# Patient Record
Sex: FEMALE | Race: WHITE | NOT HISPANIC OR LATINO | Employment: UNEMPLOYED | URBAN - METROPOLITAN AREA
[De-identification: names, ages, dates, MRNs, and addresses within clinical notes are randomized per-mention and may not be internally consistent; named-entity substitution may affect disease eponyms.]

---

## 2017-04-28 ENCOUNTER — HOSPITAL ENCOUNTER (EMERGENCY)
Facility: MEDICAL CENTER | Age: 37
End: 2017-04-30
Attending: EMERGENCY MEDICINE
Payer: MEDICAID

## 2017-04-28 DIAGNOSIS — K04.7 INFECTED DENTAL CARIES: ICD-10-CM

## 2017-04-28 DIAGNOSIS — N30.00 ACUTE CYSTITIS WITHOUT HEMATURIA: ICD-10-CM

## 2017-04-28 DIAGNOSIS — F32.A DEPRESSION, UNSPECIFIED DEPRESSION TYPE: ICD-10-CM

## 2017-04-28 DIAGNOSIS — K02.9 INFECTED DENTAL CARIES: ICD-10-CM

## 2017-04-28 DIAGNOSIS — T50.902A INTENTIONAL DRUG OVERDOSE, INITIAL ENCOUNTER (HCC): Primary | ICD-10-CM

## 2017-04-28 DIAGNOSIS — Z86.59 HISTORY OF POSTTRAUMATIC STRESS DISORDER (PTSD): ICD-10-CM

## 2017-04-28 LAB
ALBUMIN SERPL BCP-MCNC: 4.4 G/DL (ref 3.2–4.9)
ALBUMIN/GLOB SERPL: 1.6 G/DL
ALP SERPL-CCNC: 97 U/L (ref 30–99)
ALT SERPL-CCNC: 46 U/L (ref 2–50)
ANION GAP SERPL CALC-SCNC: 9 MMOL/L (ref 0–11.9)
APAP SERPL-MCNC: <10 UG/ML (ref 10–30)
AST SERPL-CCNC: 101 U/L (ref 12–45)
BASOPHILS # BLD AUTO: 0.2 % (ref 0–1.8)
BASOPHILS # BLD: 0.04 K/UL (ref 0–0.12)
BILIRUB SERPL-MCNC: 0.7 MG/DL (ref 0.1–1.5)
BUN SERPL-MCNC: 9 MG/DL (ref 8–22)
CALCIUM SERPL-MCNC: 9.1 MG/DL (ref 8.5–10.5)
CHLORIDE SERPL-SCNC: 102 MMOL/L (ref 96–112)
CO2 SERPL-SCNC: 25 MMOL/L (ref 20–33)
CREAT SERPL-MCNC: 0.69 MG/DL (ref 0.5–1.4)
EOSINOPHIL # BLD AUTO: 0.18 K/UL (ref 0–0.51)
EOSINOPHIL NFR BLD: 1.1 % (ref 0–6.9)
ERYTHROCYTE [DISTWIDTH] IN BLOOD BY AUTOMATED COUNT: 41.8 FL (ref 35.9–50)
ETHANOL BLD-MCNC: 0 G/DL
GFR SERPL CREATININE-BSD FRML MDRD: >60 ML/MIN/1.73 M 2
GLOBULIN SER CALC-MCNC: 2.8 G/DL (ref 1.9–3.5)
GLUCOSE SERPL-MCNC: 94 MG/DL (ref 65–99)
HCG SERPL QL: NEGATIVE
HCT VFR BLD AUTO: 47.2 % (ref 37–47)
HGB BLD-MCNC: 16.1 G/DL (ref 12–16)
IMM GRANULOCYTES # BLD AUTO: 0.06 K/UL (ref 0–0.11)
IMM GRANULOCYTES NFR BLD AUTO: 0.4 % (ref 0–0.9)
LIPASE SERPL-CCNC: 18 U/L (ref 11–82)
LYMPHOCYTES # BLD AUTO: 2.02 K/UL (ref 1–4.8)
LYMPHOCYTES NFR BLD: 12.1 % (ref 22–41)
MCH RBC QN AUTO: 32.7 PG (ref 27–33)
MCHC RBC AUTO-ENTMCNC: 34.1 G/DL (ref 33.6–35)
MCV RBC AUTO: 95.7 FL (ref 81.4–97.8)
MONOCYTES # BLD AUTO: 0.75 K/UL (ref 0–0.85)
MONOCYTES NFR BLD AUTO: 4.5 % (ref 0–13.4)
NEUTROPHILS # BLD AUTO: 13.7 K/UL (ref 2–7.15)
NEUTROPHILS NFR BLD: 81.7 % (ref 44–72)
NRBC # BLD AUTO: 0 K/UL
NRBC BLD AUTO-RTO: 0 /100 WBC
PLATELET # BLD AUTO: 319 K/UL (ref 164–446)
PMV BLD AUTO: 9.5 FL (ref 9–12.9)
POTASSIUM SERPL-SCNC: 3.9 MMOL/L (ref 3.6–5.5)
PROT SERPL-MCNC: 7.2 G/DL (ref 6–8.2)
RBC # BLD AUTO: 4.93 M/UL (ref 4.2–5.4)
SALICYLATES SERPL-MCNC: 0 MG/DL (ref 15–25)
SODIUM SERPL-SCNC: 136 MMOL/L (ref 135–145)
WBC # BLD AUTO: 16.8 K/UL (ref 4.8–10.8)

## 2017-04-28 PROCEDURE — 83690 ASSAY OF LIPASE: CPT

## 2017-04-28 PROCEDURE — 84703 CHORIONIC GONADOTROPIN ASSAY: CPT

## 2017-04-28 PROCEDURE — 85025 COMPLETE CBC W/AUTO DIFF WBC: CPT

## 2017-04-28 PROCEDURE — 80307 DRUG TEST PRSMV CHEM ANLYZR: CPT

## 2017-04-28 PROCEDURE — 99285 EMERGENCY DEPT VISIT HI MDM: CPT

## 2017-04-28 PROCEDURE — 80053 COMPREHEN METABOLIC PANEL: CPT

## 2017-04-28 RX ORDER — TRAMADOL HYDROCHLORIDE 50 MG/1
50 TABLET ORAL EVERY 4 HOURS PRN
COMMUNITY

## 2017-04-28 ASSESSMENT — PAIN SCALES - GENERAL: PAINLEVEL_OUTOF10: 0

## 2017-04-29 LAB
AMPHET UR QL SCN: POSITIVE
APPEARANCE UR: ABNORMAL
BACTERIA #/AREA URNS HPF: ABNORMAL /HPF
BARBITURATES UR QL SCN: NEGATIVE
BENZODIAZ UR QL SCN: NEGATIVE
BILIRUB UR QL STRIP.AUTO: NEGATIVE
BZE UR QL SCN: NEGATIVE
CANNABINOIDS UR QL SCN: NEGATIVE
COLOR UR: YELLOW
EKG IMPRESSION: NORMAL
EPI CELLS #/AREA URNS HPF: ABNORMAL /HPF
GLUCOSE UR STRIP.AUTO-MCNC: NEGATIVE MG/DL
HYALINE CASTS #/AREA URNS LPF: ABNORMAL /LPF
KETONES UR STRIP.AUTO-MCNC: 20 MG/DL
LEUKOCYTE ESTERASE UR QL STRIP.AUTO: ABNORMAL
MDMA UR QL SCN: NEGATIVE
METHADONE UR QL SCN: NEGATIVE
MICRO URNS: ABNORMAL
MUCOUS THREADS #/AREA URNS HPF: ABNORMAL /HPF
NITRITE UR QL STRIP.AUTO: NEGATIVE
OPIATES UR QL SCN: NEGATIVE
OXYCODONE UR QL SCN: NEGATIVE
PCP UR QL SCN: NEGATIVE
PH UR STRIP.AUTO: 5.5 [PH]
PROPOXYPH UR QL SCN: NEGATIVE
PROT UR QL STRIP: NEGATIVE MG/DL
RBC # URNS HPF: ABNORMAL /HPF
RBC UR QL AUTO: NEGATIVE
SP GR UR STRIP.AUTO: 1.02
WBC #/AREA URNS HPF: ABNORMAL /HPF

## 2017-04-29 PROCEDURE — A9270 NON-COVERED ITEM OR SERVICE: HCPCS | Performed by: EMERGENCY MEDICINE

## 2017-04-29 PROCEDURE — 700102 HCHG RX REV CODE 250 W/ 637 OVERRIDE(OP): Performed by: EMERGENCY MEDICINE

## 2017-04-29 PROCEDURE — 90791 PSYCH DIAGNOSTIC EVALUATION: CPT

## 2017-04-29 PROCEDURE — 80307 DRUG TEST PRSMV CHEM ANLYZR: CPT

## 2017-04-29 PROCEDURE — 93005 ELECTROCARDIOGRAM TRACING: CPT | Performed by: EMERGENCY MEDICINE

## 2017-04-29 PROCEDURE — 81001 URINALYSIS AUTO W/SCOPE: CPT | Mod: 59

## 2017-04-29 RX ORDER — NICOTINE 21 MG/24HR
1 PATCH, TRANSDERMAL 24 HOURS TRANSDERMAL ONCE
Status: COMPLETED | OUTPATIENT
Start: 2017-04-29 | End: 2017-04-30

## 2017-04-29 RX ORDER — AMOXICILLIN AND CLAVULANATE POTASSIUM 875; 125 MG/1; MG/1
1 TABLET, FILM COATED ORAL 2 TIMES DAILY
COMMUNITY

## 2017-04-29 RX ORDER — AMOXICILLIN AND CLAVULANATE POTASSIUM 875; 125 MG/1; MG/1
1 TABLET, FILM COATED ORAL 2 TIMES DAILY
Qty: 20 TAB | Refills: 0 | Status: SHIPPED | OUTPATIENT
Start: 2017-04-29 | End: 2017-05-09

## 2017-04-29 RX ORDER — AMOXICILLIN AND CLAVULANATE POTASSIUM 875; 125 MG/1; MG/1
1 TABLET, FILM COATED ORAL EVERY 12 HOURS
Status: DISCONTINUED | OUTPATIENT
Start: 2017-04-29 | End: 2017-04-30 | Stop reason: HOSPADM

## 2017-04-29 RX ORDER — AMOXICILLIN AND CLAVULANATE POTASSIUM 875; 125 MG/1; MG/1
1 TABLET, FILM COATED ORAL ONCE
Status: COMPLETED | OUTPATIENT
Start: 2017-04-29 | End: 2017-04-29

## 2017-04-29 RX ORDER — NICOTINE 21 MG/24HR
1 PATCH, TRANSDERMAL 24 HOURS TRANSDERMAL ONCE
Status: DISCONTINUED | OUTPATIENT
Start: 2017-04-29 | End: 2017-04-29

## 2017-04-29 RX ORDER — LORAZEPAM 1 MG/1
1 TABLET ORAL ONCE
Status: COMPLETED | OUTPATIENT
Start: 2017-04-29 | End: 2017-04-29

## 2017-04-29 RX ADMIN — AMOXICILLIN AND CLAVULANATE POTASSIUM 1 TABLET: 875; 125 TABLET, FILM COATED ORAL at 20:30

## 2017-04-29 RX ADMIN — AMOXICILLIN AND CLAVULANATE POTASSIUM 1 TABLET: 875; 125 TABLET, FILM COATED ORAL at 05:56

## 2017-04-29 RX ADMIN — LORAZEPAM 1 MG: 1 TABLET ORAL at 14:00

## 2017-04-29 RX ADMIN — NICOTINE 14 MG: 14 PATCH, EXTENDED RELEASE TOPICAL at 06:30

## 2017-04-29 RX ADMIN — NICOTINE 1 PATCH: 21 PATCH, EXTENDED RELEASE TRANSDERMAL at 14:24

## 2017-04-29 ASSESSMENT — PAIN SCALES - GENERAL: PAINLEVEL_OUTOF10: 0

## 2017-04-29 NOTE — ED NOTES
Received report from Ryan BENITEZ. Pt resting in bed under blankets, no distress observed. Even and unlabored breathing. Direct observation maintained with sitter present.

## 2017-04-29 NOTE — ED PROVIDER NOTES
"ED Provider Note    CHIEF COMPLAINT  Chief Complaint   Patient presents with   • Suicidal Ideation     pt intentionally took 20 tramadol today to try to kill herself, pt crying ambulatory without difficulty, aox4, anxious, denies any vomiting since took pills aprox 1830 tonight has medication for an abscessed tooth she has    • Drug Overdose       HPI  Latasha Clemente is a 36 y.o. female who presents to the emergency department by ambulance for intentional drug overdose. Patient states she \"just wanted to end it.\" History of PTSD, anxiety noncompliant with Geodon and Seroquel. States she has been at both [facility] previously. Patient is being treated for an infected tooth, intermittent compliant with the antibiotics, however states she was given tramadol for her discomfort. She states she took 20 tramadol tablets at approximately 6:30 PM (4 hours prior to arrival). Denies nausea or vomiting since that time. Denies abdominal pain. Denies sleepiness or altered mental status. No chest pain or shortness of breath. Admits to tobacco and methamphetamine use. Last meth use, yesterday, both inhaled and injected. Denies alcohol use. Denies other illicit drug use. Previous SI attempt with self-inflicted wounds, lacerations to right forearm.    REVIEW OF SYSTEMS  See HPI for further details. All other systems are negative.     PAST MEDICAL HISTORY   has a past medical history of Psychiatric disorder.    SOCIAL HISTORY  Social History     Social History Main Topics   • Smoking status: Current Every Day Smoker -- 0.50 packs/day     Types: Cigarettes   • Smokeless tobacco: Not on file   • Alcohol Use: Yes      Comment: occasionally   • Drug Use: Yes     Special: Inhaled, Intravenous      Comment: IV meth used yesterday   • Sexual Activity: Not on file       SURGICAL HISTORY  patient denies any surgical history    CURRENT MEDICATIONS  Home Medications     Reviewed by Carri Zhao RCHARLOTTE. " "(Registered Nurse) on 04/28/17 at 2206  Med List Status: Not Addressed    Medication Last Dose Status    tramadol (ULTRAM) 50 MG Tab  Active                ALLERGIES  Allergies   Allergen Reactions   • Naproxen Swelling   • Pomegranate [[place]] Swelling       PHYSICAL EXAM  VITAL SIGNS: /77 mmHg  Pulse 88  Temp(Src) 37.1 °C (98.8 °F)  Resp 16  Ht 1.651 m (5' 5\")  Wt 71 kg (156 lb 8.4 oz)  BMI 26.05 kg/m2  SpO2 91%  LMP 04/14/2017 (Approximate)  Pulse ox interpretation: I interpret this pulse ox as normal.  Constitutional: Alert in no apparent distress. Tearful. Histrionic. Disheveled.  HENT: Normocephalic, atraumatic. Bilateral external ears normal, Nose normal. Moist mucous membranes.  Poor dentition. Missing and fractured teeth. Tenderness to percussion burst right upper molar evidence for dental carry. No gingival swelling or fluctuance. No facial swelling or cellulitis. Uvula midline, tolerating secretions. No stridor or dysphonia.  Eyes: Pupils are equal and reactive, Conjunctiva normal.   Neck: Normal range of motion, Supple. No evidence of meningeal irritation.  Cardiovascular: Regular rate and rhythm, no murmurs. Distal pulses intact.    Thorax & Lungs: Normal breath sounds.  No wheezing/rales/ronchi. No increased work of breathing.   Abdomen: Soft, non-distended, non-tender to palpation. No rebound, guarding or peritonitis.  Skin: Warm, Dry, No erythema, No rash.   Musculoskeletal: Good range of motion in all major joints.   Neurologic: Alert , no gross focal deficit noted.  Psychiatric: Tearful. Histrionic at times. Depressed. Suicidal ideation and attempt. Cooperative.      DIAGNOSTIC STUDIES / PROCEDURES    EKG  I interpreted this EKG myself.  This is a 12-lead study.  The rhythm is sinus with a rate of 81.  There are no ST segment nor T wave abnormalities. Normal intervals, . Interpretation: No ST segment elevation myocardial infarction no ectopy. No arrhythmia.      LABS  Results " for orders placed or performed during the hospital encounter of 04/28/17   Blood Alcohol   Result Value Ref Range    Diagnostic Alcohol 0.00 0.00 g/dL   CBC WITH DIFFERENTIAL   Result Value Ref Range    WBC 16.8 (H) 4.8 - 10.8 K/uL    RBC 4.93 4.20 - 5.40 M/uL    Hemoglobin 16.1 (H) 12.0 - 16.0 g/dL    Hematocrit 47.2 (H) 37.0 - 47.0 %    MCV 95.7 81.4 - 97.8 fL    MCH 32.7 27.0 - 33.0 pg    MCHC 34.1 33.6 - 35.0 g/dL    RDW 41.8 35.9 - 50.0 fL    Platelet Count 319 164 - 446 K/uL    MPV 9.5 9.0 - 12.9 fL    Neutrophils-Polys 81.70 (H) 44.00 - 72.00 %    Lymphocytes 12.10 (L) 22.00 - 41.00 %    Monocytes 4.50 0.00 - 13.40 %    Eosinophils 1.10 0.00 - 6.90 %    Basophils 0.20 0.00 - 1.80 %    Immature Granulocytes 0.40 0.00 - 0.90 %    Nucleated RBC 0.00 /100 WBC    Neutrophils (Absolute) 13.70 (H) 2.00 - 7.15 K/uL    Lymphs (Absolute) 2.02 1.00 - 4.80 K/uL    Monos (Absolute) 0.75 0.00 - 0.85 K/uL    Eos (Absolute) 0.18 0.00 - 0.51 K/uL    Baso (Absolute) 0.04 0.00 - 0.12 K/uL    Immature Granulocytes (abs) 0.06 0.00 - 0.11 K/uL    NRBC (Absolute) 0.00 K/uL   COMP METABOLIC PANEL   Result Value Ref Range    Sodium 136 135 - 145 mmol/L    Potassium 3.9 3.6 - 5.5 mmol/L    Chloride 102 96 - 112 mmol/L    Co2 25 20 - 33 mmol/L    Anion Gap 9.0 0.0 - 11.9    Glucose 94 65 - 99 mg/dL    Bun 9 8 - 22 mg/dL    Creatinine 0.69 0.50 - 1.40 mg/dL    Calcium 9.1 8.5 - 10.5 mg/dL    AST(SGOT) 101 (H) 12 - 45 U/L    ALT(SGPT) 46 2 - 50 U/L    Alkaline Phosphatase 97 30 - 99 U/L    Total Bilirubin 0.7 0.1 - 1.5 mg/dL    Albumin 4.4 3.2 - 4.9 g/dL    Total Protein 7.2 6.0 - 8.2 g/dL    Globulin 2.8 1.9 - 3.5 g/dL    A-G Ratio 1.6 g/dL   LIPASE   Result Value Ref Range    Lipase 18 11 - 82 U/L   HCG QUAL SERUM   Result Value Ref Range    Beta-Hcg Qualitative Serum Negative Negative   Acetaminophen Level   Result Value Ref Range    Acetaminophen -Tylenol <10 10 - 30 ug/mL   SALICYLATE LEVEL   Result Value Ref Range    Salicylates,  Quant. 0 (L) 15 - 25 mg/dL   ESTIMATED GFR   Result Value Ref Range    GFR If African American >60 >60 mL/min/1.73 m 2    GFR If Non African American >60 >60 mL/min/1.73 m 2   EKG (NOW)   Result Value Ref Range    Report       [facility] Emergency Dept.    Test Date:  2017  Pt Name:    ASH RODRIGUEZ            Department: ER  MRN:        [id number]                      Room:       [facility]  Gender:     F                            Technician: [id number]  :        1980                   Requested By:HANNAH FERRELL  Order #:    [id number]                    Reading MD:    Measurements  Intervals                                Axis  Rate:       81                           P:          70  RI:         132                          QRS:        77  QRSD:       82                           T:          47  QT:         384  QTc:        446    Interpretive Statements  SINUS RHYTHM  CONSIDER RIGHT ATRIAL ABNORMALITY  No previous ECG available for comparison         COURSE & MEDICAL DECISION MAKING  Nursing notes and vital signs were reviewed. (See chart for details)  The patients records were reviewed, history was obtained from the patient;     Patient placed on a legal hold by nursing staff on arrival.    10:41 PM poison control. Case number [id number]. ELI Botello.  Observe for seizures, treated with benzodiazepines. Serotonin syndrome, treated with benzodiazepines and IV fluid. CNS or respiratory depression treat with symptomatic care. Observe for 6 hours from ingestion and/or until asymptomatic. Tox labs per routine.    Additionally, patient with history of dental pain, suspect infected dental caries given exam. No evidence for facial cellulitis. No oral abscess or airway compromise. She will be treated with Augmentin for 10 days, 1st dose provided in the emergency department.    0130 - labs are unremarkable, normal/negative acetaminophen and salicylate drawn 4 hours postingestion. Patient  remains asymptomatic. She is sleeping but arousable and tolerating oral fluids. Vital signs remained stable without tachycardia or labile blood pressure. She was never hypoxic. Patient is medically cleared for likely skilled evaluation.    0220 - seen and evaluated at bedside by karen Cobb. Although patient denies suicidal ideation at this time, she had attempted suicide with reported drug overdose prior to arrival. Patient with history of psychiatric illness, noncompliant with medications, legal hold has been completed. She will be transferred to psychiatric facility when a bed is available. After such evaluation, patient wishes to detox as she states, she has been referred to [facility].       FINAL IMPRESSION  (T50.902A) Intentional drug overdose, initial encounter (CMS-[facility])  (primary encounter diagnosis)  (F32.9) Depression, unspecified depression type  (Z86.59) History of posttraumatic stress disorder (PTSD)  (K02.9,  K04.7) Infected dental caries      Electronically signed by: Carri Foster, 4/28/2017 10:29 PM      This dictation was created using voice recognition software. The accuracy of the dictation is limited to the abilities of the software. I expect there may be some errors of grammar and possibly content. The nursing notes were reviewed and certain aspects of this information were incorporated into this note.

## 2017-04-29 NOTE — ED NOTES
Alert team consult completed. EKG now being completed. Pt aware of POC. Will provide urine.   Remains on direct obs of designee monitoring in hallway.

## 2017-04-29 NOTE — ED NOTES
.  Chief Complaint   Patient presents with   • Suicidal Ideation     pt intentionally took 20 tramadol today to try to kill herself, pt crying ambulatory without difficulty, aox4, anxious, denies any vomiting since took pills aprox 1830 tonight has medication for an abscessed tooth she has    • Drug Overdose

## 2017-04-29 NOTE — CONSULTS
[facility]   TRIAGE ASSESSMENT    Name: Latasha Clemente  MRN: [id number]  : 1980  Age: 36 y.o.  Date of assessment: 2017  PCP: Pcp Pt States None  Persons in attendance: Patient    CHIEF COMPLAINT/PRESENTING ISSUE (as stated by Latasha):   Chief Complaint   Patient presents with   • Suicidal Ideation     pt intentionally took 20 tramadol today to try to kill herself, pt crying ambulatory without difficulty, aox4, anxious, denies any vomiting since took pills aprox 183 tonight has medication for an abscessed tooth she has    • Drug Overdose        CURRENT LIVING SITUATION/SOCIAL SUPPORT: homeless    BEHAVIORAL HEALTH TREATMENT HISTORY  Does patient/parent report a history of prior behavioral health treatment for patient?   No:    SAFETY ASSESSMENT - SELF  Does patient acknowledge current or past symptoms of dangerousness to self?Patient initially stated that she overdosed on the Tramadol in a suicide attempt;  I have 2 children and I  Can't be a bad example ([facility],living with their father)  Does parent/significant other report patient has current or past symptoms of dangerousness to self? N\A  Does presenting problem suggest symptoms of dangerousness to self? yes    SAFETY ASSESSMENT - OTHERS  Does patient acknowledge current or past symptoms of aggressive behavior or risk to others? no  Does parent/significant other report patient has current or past symptoms of aggressive behavior or risk to others?  N\A  Does presenting problem suggest symptoms of dangerousness to others? No    Crisis Safety Plan completed and copy given to patient? no    ABUSE/NEGLECT SCREENING  Does patient report feeling “unsafe” in his/her home, or afraid of anyone?  no  Does patient report any history of physical, sexual, or emotional abuse?  no  Does parent or significant other report any of the above? no  Is there evidence of neglect by self?  no  Is there evidence of neglect by a caregiver? no  Does the  "patient/parent report any history of CPS/APS/police involvement related to suspected abuse/neglect or domestic violence? no  Based on the information provided during the current assessment, is a mandated report of suspected abuse/neglect being made?  No    SUBSTANCE USE SCREENING  Yes:  Gagan all substances used in the past 30 days:      Last Use Amount   [x]   Alcohol Swishing around in my mouth Almost never   [x]   Marijuana 1 x per year    []   Heroin     []   Prescription Opioids  (used without prescription, for    recreation, or in excess of prescribed amount)     []   Other Prescription  (used without prescription, for    recreation, or in excess of prescribed amount)     []   Cocaine      [x]   Methamphetamine everyday 20 or 40 sack   []   \"\" drugs (ectasy, MDMA)     []   Other substances        UDS results: meth  Breathalyzer results: negative    What consequences does the patient associate with any of the above substance use and or addictive behaviors? Work problems or losses: , Relationship problems: , Family problems: , Health problems: , Monetary problems:     Risk factors for detox (check all that apply):  []  Seizures   []  Diaphoretic (sweating)   [x]  Tremors   []  Hallucinations   [x]  Increased blood pressure   []  Decreased blood pressure   [x] anxiety   []  None      [] Patient education on risk factors for detoxification and instructed to return to ER as needed.    UDS results:   Breathalyzer results:   Risk factors for detox (check all that apply):  [] Seizures   [] Diaphoretic (sweating)   [] Tremors   [] Hallucinations   [] Increased blood pressure   [] Decreased blood pressure   [] Other    [] Patient education on risk factors for detoxification and instructed to return to ER as needed.  MENTAL STATUS   Participation: Active verbal participation  Grooming: Casual and Neat  Orientation: Fully Oriented and Drowsy/Somnolent  Behavior: Calm  Eye contact: Limited  Mood: Depressed and " "Anxious  Affect: Congruent with content  Thought process: Logical and Goal-directed  Thought content: Within normal limits  Speech: Rate within normal limits and Soft  Perception: Within normal limits  Memory:  No gross evidence of memory deficits  Insight: Adequate  Judgment:  Adequate  Other:    Source:  [] Significant other present in person:   [] Significant other by telephone  [] [facility]   [x] [facility] Nursing Staff/Dr Foster F15.20  [x] [facility] Medical Record  [] Other:     [] Unable to complete full assessment due to:  [] Acute intoxication  [] Patient declined to participate/engage  [] Patient verbally unresponsive  [] Significant cognitive deficits  [] Significant perceptual distortions or behavioral disorganization  [] Other:      CLINICAL IMPRESSIONS:  Primary:  Amphetamine use D/O  Secondary:     MDD F33.9    IDENTIFIED NEEDS/PLAN: r DISPOSITION list for any items marked]    [x]  Imminent safety risk - self [] Imminent safety risk - others   [x]  Acute substance withdrawl []  Psychosis/Impaired reality testing   [x]  Mood/anxiety [x]  Substance use/Addictive behavior   []  Maladaptive behaviro []  Parent/child conflict   []  Family/Couples conflict []  Biomedical   [x]  Housing []  Financial   []   Legal  Occupational/Educational   []  Domestic violence []  Other:     Disposition: Refer to [facility] and [facility]    Does patient express agreement with the above plan? no    Referral appointment(s) scheduled? no    Alert team only: 36 year old female reports that she took an overdose of 20 Tramadol in a suicide attempt.  Uses \"as much meth as I can get my hands on\" daily.  I have discussed findings and recommendations with Dr. Foster who is in agreement with these recommendations.     Referral documentation sent to the following facilities:  [facility] BROCK/REGINALDO Kay R.N.  4/29/2017                "

## 2017-04-29 NOTE — ED NOTES
Patient is being verbally confrontational and cursing at staff. The patient is cursing at Alert team RN. ER Charge Nurse notified and patient will be transported back to [facility].

## 2017-04-29 NOTE — ED NOTES
"Pt requesting something \"for her withdrawal\". PT ate her breakfast tray on the way down to [facility], informed she will soon get a lunch tray. PT alert/oriented. Pt told CNA down stairs that \"she doesn't want to talk to her boyfriend cause he's a douche, and she doesn't want him to know she's here\". Pt inquired if she had any phone calls. Informed she has not received any calls.   "

## 2017-04-29 NOTE — CONSULTS
Latasha was taken to [facility] but started agitating the other pt. Acting out, cussing this f--- place.  Called security and charge desk for bed assignment to bring back up stairs.  She is not appropriate at this time to be in that environment. Manic at this time with mult. C/o about staff and [facility]. Medication given for lessening of her escalating behavior. Awaiting acceptance [facility] for evaluation and treatment.

## 2017-04-29 NOTE — ED NOTES
"PT resting in bed. States \"why do people keep bothering me! I just want to sleep!\". Even and unlabored breathing. No distress.  "

## 2017-04-29 NOTE — ED NOTES
NICOTINE PATCH REMOVED AT 1435 THAT WAS PLACED THIS AM. REPLACED WITH STRONGER DOSE OF NICOTINE PATCH.

## 2017-04-29 NOTE — ED NOTES
Med rec complete per patient.  Pt denies taking any meds other than what was prescribed for dental work.  Allergies reviewed.  Pt states she started a 10 day course of Augmentin 4/28.

## 2017-04-29 NOTE — DISCHARGE INSTRUCTIONS
Patient is medically cleared for transfer to psychiatric facility when a bed is available.    Clinical evidence for infected dental caries, dental abscess not requiring incision and drainage. No facial cellulitis.    Augmentin twice daily for 10 days.    Follow-up with a dentist as soon as possible for reevaluation and definitive treatment.    Return to the emergency department for persistent or worsening dental pain, facial or oral swelling, difficulty swallowing or breathing, fever or other new concerns.    Abscessed Tooth  An abscessed tooth is an infection around your tooth. It may be caused by holes or damage to the tooth (cavity) or a dental disease. An abscessed tooth causes mild to very bad pain in and around the tooth. See your dentist right away if you have tooth or gum pain.  HOME CARE  · Take your medicine as told. Finish it even if you start to feel better.  · Do not drive after taking pain medicine.  · Rinse your mouth (gargle) often with salt water (¼ teaspoon salt in 8 ounces of warm water).  · Do not apply heat to the outside of your face.  GET HELP RIGHT AWAY IF:   · You have a temperature by mouth above 102° F (38.9° C), not controlled by medicine.  · You have chills and a very bad headache.  · You have problems breathing or swallowing.  · Your mouth will not open.  · You develop puffiness (swelling) on the neck or around the eye.  · Your pain is not helped by medicine.  · Your pain is getting worse instead of better.  MAKE SURE YOU:   · Understand these instructions.  · Will watch your condition.  · Will get help right away if you are not doing well or get worse.  Document Released: 06/05/2009 Document Revised: 03/11/2013 Document Reviewed: 03/27/2012  [employer]® Patient Information ©2014 [employer].

## 2017-04-29 NOTE — ED NOTES
Pt amb to restroom to provide urine specimen. Remains under direct obs of designee monitoring in hallway.

## 2017-04-29 NOTE — ED NOTES
Pt cont to sleep in [facility]. NAD noted. Respirations even, equal and unlabored.    Remains under direct obs of designee monitoring in hallway.   VSS on monitor.

## 2017-04-29 NOTE — ED NOTES
Pt cont to sleep in [facility]. NAD noted. Respirations even, equal and unlabored.    Remains under direct obs of designee monitoring in hallway.

## 2017-04-29 NOTE — ED NOTES
Pt cont to sleep in [facility]. NAD noted. Respirations even, equal and unlabored.    Remains under direct obs of designee monitoring in hallway.   VSS on monitor.

## 2017-04-29 NOTE — ED PROVIDER NOTES
"ED PROVIDER NOTE    Scribed for Pati Maharaj D.O. by Pati Maharaj. 4/29/2017, 6:20 AM.    This is an addendum to the note on Latasha Clemente. For further details and full chart entry, see the previously signed ED Provider Note written by Dr. Foster (ERP).      6:20 AM - I discussed the patient's case with Dr. Foster (ERP) who will transfer care of the patient to me at this time.        6:20 AM to decide by nurse, that the patient requests a \"fucking ciagrette\" ordered a nicoderm patch.    9:24 AM I rechecked the patient at bedside. There were no requests at this time. The patient asked to not be bothered so she can sleep.    Patient care will be signed out to oncoming ERP.    FINAL IMPRESSION   1. Intentional drug overdose, initial encounter (CMS-[facility])    2. Depression, unspecified depression type    3. History of posttraumatic stress disorder (PTSD)    4. Infected dental caries                 "

## 2017-04-29 NOTE — ED NOTES
Pt asleep in [facility]. NAD noted. Respirations even, equal and unlabored.    Remains under direct obs of designee monitoring in [facility].   VS remain stable on monitor.

## 2017-04-29 NOTE — ED NOTES
Pt asleep in [facility]. NAD noted. Respirations even, equal and unlabored.    Remains under direct obs of designee monitoring in [facility].

## 2017-04-29 NOTE — ED NOTES
Pt report to Ryan BENITEZ pt moved to room g31 at this time, has 6 personal belongings bags in secure ambulance bay holding area

## 2017-04-29 NOTE — PROGRESS NOTES
Patient's home medications have been reviewed by the pharmacy team.     Past Medical History   Diagnosis Date   • Psychiatric disorder      drug abuse       Patient's Medications   New Prescriptions    AMOXICILLIN-CLAVULANATE (AUGMENTIN) 875-125 MG TAB    Take 1 Tab by mouth 2 times a day for 10 days.   Previous Medications    AMOXICILLIN-CLAVULANATE (AUGMENTIN) 875-125 MG TAB    Take 1 Tab by mouth 2 times a day. Started 10 day course 4/28.    TRAMADOL (ULTRAM) 50 MG TAB    Take 50 mg by mouth every four hours as needed.   Modified Medications    No medications on file   Discontinued Medications    No medications on file          A:  Medications do not appear to be contributing to current complaints.     P:    No recommendations at this time. Home medications have been reordered as appropriate following discussion with ERP.      Willie YoderD, BCPS

## 2017-04-29 NOTE — ED NOTES
Report received from Carri BENITEZ. Pt transferred to room 31, monitor in place, rest of room stripped for pt safety. Will cont to monitor.

## 2017-04-29 NOTE — ED NOTES
Pt was prescribed tramadol 50mg today for an abscessed tooth she has the bottle is empty, had 20 in bottle, pt also agrees that she took all 20 tabs

## 2017-04-30 VITALS
BODY MASS INDEX: 26.08 KG/M2 | TEMPERATURE: 98.2 F | WEIGHT: 156.53 LBS | RESPIRATION RATE: 20 BRPM | DIASTOLIC BLOOD PRESSURE: 57 MMHG | SYSTOLIC BLOOD PRESSURE: 100 MMHG | HEART RATE: 98 BPM | HEIGHT: 65 IN | OXYGEN SATURATION: 98 %

## 2017-04-30 PROCEDURE — A9270 NON-COVERED ITEM OR SERVICE: HCPCS | Performed by: EMERGENCY MEDICINE

## 2017-04-30 PROCEDURE — 700111 HCHG RX REV CODE 636 W/ 250 OVERRIDE (IP): Performed by: EMERGENCY MEDICINE

## 2017-04-30 PROCEDURE — 96372 THER/PROPH/DIAG INJ SC/IM: CPT

## 2017-04-30 PROCEDURE — 700102 HCHG RX REV CODE 250 W/ 637 OVERRIDE(OP): Performed by: EMERGENCY MEDICINE

## 2017-04-30 RX ORDER — MIDAZOLAM HYDROCHLORIDE 1 MG/ML
3 INJECTION INTRAMUSCULAR; INTRAVENOUS ONCE
Status: COMPLETED | OUTPATIENT
Start: 2017-04-30 | End: 2017-04-30

## 2017-04-30 RX ORDER — LORAZEPAM 1 MG/1
1 TABLET ORAL ONCE
Status: COMPLETED | OUTPATIENT
Start: 2017-04-30 | End: 2017-04-30

## 2017-04-30 RX ORDER — LORAZEPAM 1 MG/1
1 TABLET ORAL PRN
Status: DISCONTINUED | OUTPATIENT
Start: 2017-04-30 | End: 2017-04-30 | Stop reason: HOSPADM

## 2017-04-30 RX ORDER — ACETAMINOPHEN 325 MG/1
650 TABLET ORAL EVERY 6 HOURS PRN
Status: DISCONTINUED | OUTPATIENT
Start: 2017-04-30 | End: 2017-04-30 | Stop reason: HOSPADM

## 2017-04-30 RX ORDER — IBUPROFEN 600 MG/1
600 TABLET ORAL 4 TIMES DAILY PRN
Status: DISCONTINUED | OUTPATIENT
Start: 2017-04-30 | End: 2017-04-30 | Stop reason: HOSPADM

## 2017-04-30 RX ADMIN — MIDAZOLAM HYDROCHLORIDE 1.5 MG: 1 INJECTION, SOLUTION INTRAMUSCULAR; INTRAVENOUS at 15:35

## 2017-04-30 RX ADMIN — AMOXICILLIN AND CLAVULANATE POTASSIUM 1 TABLET: 875; 125 TABLET, FILM COATED ORAL at 09:40

## 2017-04-30 RX ADMIN — ACETAMINOPHEN 650 MG: 325 TABLET, FILM COATED ORAL at 11:59

## 2017-04-30 RX ADMIN — LORAZEPAM 1 MG: 1 TABLET ORAL at 14:17

## 2017-04-30 RX ADMIN — IBUPROFEN 600 MG: 600 TABLET, FILM COATED ORAL at 12:08

## 2017-04-30 RX ADMIN — MIDAZOLAM HYDROCHLORIDE 1.5 MG: 1 INJECTION, SOLUTION INTRAMUSCULAR; INTRAVENOUS at 15:36

## 2017-04-30 RX ADMIN — LORAZEPAM 1 MG: 1 TABLET ORAL at 05:52

## 2017-04-30 NOTE — ED NOTES
No distress noted; pt resting supine on gurney, eyes closed, even respirations noted; pt remains in close observation in direct view of sitter in hallway

## 2017-04-30 NOTE — ED NOTES
Pt was given 3mg Versed via 1.5mg IM injections, see MAR for details; pt was transported via [facility] with PD assistance; pt belongings- 5 bags and duffle bag taken by PD to accompany pt to [employer]

## 2017-04-30 NOTE — ED NOTES
"Patient increasingly agitated asking for medications for her \"withdrawal symptoms.\" ERP made aware.  "

## 2017-04-30 NOTE — ED NOTES
"RN medicated pt with Motrin in addition to Tylenol; pt states \"I want Ativan so I can be knocked out and just sleep\"; RN has been sleeping most the day at this point, RN does not feel Ativan is appropriate at this time  "

## 2017-04-30 NOTE — CONSULTS
"Latasha continues to be un-inviting towards this nurse, only wants to sleep and eat and be left alone and \" not talk to anyone.\"  She remains on legal 2000 awaiting acceptance [facility] for evaluation and treatment.    "

## 2017-04-30 NOTE — DISCHARGE PLANNING
Medical Social Work    Referral: Legal hold Transfer to Mental Health Facility    Intervention: SW received call from Janessa at [facility] stating that Dr. Sahni has accepted the patient for admission.     SW arranged for transportation to be set up through [facility]    Pt has transport benefit through [facility].     The pt will be picked up at 1415.     HALLIE notified the RN of the departure time as well as accepting facility.     HALLIE created transfer packet and placed on chart. Original Legal Hold placed in packet.     Plan: Pt will transfer to [facility] at 1415.

## 2017-04-30 NOTE — ED PROVIDER NOTES
ED PROVIDER NOTE    Scribed for Pati Maharaj D.O. by Pati Maharaj. 4/30/2017, 9:08 AM.    This is an addendum to the note on Latasha Clemente. For further details and full chart entry, see the previously signed ED Provider Note written by prior ERP.       9:08 AM - Patient resting comfortably. No requests. No complaints. Tolerating a regular diet.        FINAL IMPRESSION   1. Intentional drug overdose, initial encounter ([facility])    2. Depression, unspecified depression type    3. History of posttraumatic stress disorder (PTSD)    4. Infected dental caries    5. Acute cystitis without hematuria

## 2017-04-30 NOTE — ED NOTES
[facility] [place] [facility] [place] and 4 points if needed; Christine, Charge, security, SW and RN involved in transferring pt to [place]; pt is angry and is refusing to go to [place]; [facility] was phoned to return to ER; security and PD remains outside pt door

## 2017-04-30 NOTE — DISCHARGE PLANNING
Medical Social Work    Referral: Legal Hold    Intervention: Legal Hold Paperwork given to SW by [employer] RN Teresa    Legal Hold Initiated: Date: 4/28/2017 Time: 2200    Patient’s Insurance Listed on Face Sheet: Medicaid HMO    Referrals sent to: [facility]    This referral contains the following information:  1) Face sheet _x___  2) Page 1 and Page 2 of Legal Hold __x__  3) Alert Team Assessment/Psych Assessment __x__  4) Head to toe physical exam __x__  5) Urine Drug Screen __x__  6) Blood Alcohol __x__  7) Vital signs __x__  8) Pregnancy test when applicable _x__  9) Medications list _x___    Fax confirmation received.    Plan: Patient will transfer to mental health facility once acceptance is obtained

## 2017-04-30 NOTE — ED NOTES
Pt is punching, kicking and swearing, refusing to go [facility], security x 2 at bedside along with EDT and RN; RN is unable to reason with pt at this time; [facility] called to transfer pt to [facility]

## 2017-04-30 NOTE — ED NOTES
No distress noted; pt resting on left side on gurney, eyes closed, even respirations noted; pt remains in close observation in direct view of sitter in hallway

## 2017-04-30 NOTE — DISCHARGE PLANNING
Contacted [place] to update on pt behavior problems when [facility] arrived for transport. Spoke with Justin who is aware and recommended to continue with transfer. [employer] arrived to help with safe transport. All 6 bags of belongings went with pt.

## 2017-04-30 NOTE — ED NOTES
RN at bedside, pt woke to verbal stimuli, pt is oriented, appropriate at this time; RN provided pt breakfast tray and administered am medication as ordered

## 2017-04-30 NOTE — ED PROVIDER NOTES
ED Provider Note    ER P addendum    The patient is currently being held in the emergency department after suicide attempt by Ultram overdose. She requested medication for anxiety and nicotine withdrawal and I ordered an nicotine patch for her and she was given 1 mg of oral Ativan. I have reviewed her chart and the patient was found to have a dental infection likely secondary to dental caries and also urinalysis reflects urinary tract infection and the patient has been started on Augmentin twice a day. The patient's face may be very minimally erythematous on the right side but I don't see any induration or other swelling and I do not think at this point in time she needs to be admitted to the hospital. We will continue supportive care while she is in the emergency department and she will be transferred to a psychiatric hospital once accepted. The patient ER care will be signed out to the 8 PM ER doctor

## 2017-04-30 NOTE — ED NOTES
"Pt complained of \"face pain from tooth\", RN medicated with Tylenol pt states \"tylenol dont work, I need motrin and ativan\"; RN communicated needs to ERP, order received  "